# Patient Record
Sex: FEMALE | Employment: FULL TIME | URBAN - METROPOLITAN AREA
[De-identification: names, ages, dates, MRNs, and addresses within clinical notes are randomized per-mention and may not be internally consistent; named-entity substitution may affect disease eponyms.]

---

## 2021-05-07 ENCOUNTER — IMMUNIZATIONS (OUTPATIENT)
Dept: FAMILY MEDICINE CLINIC | Facility: HOSPITAL | Age: 66
End: 2021-05-07

## 2021-05-07 DIAGNOSIS — Z23 ENCOUNTER FOR IMMUNIZATION: Primary | ICD-10-CM

## 2021-05-07 PROCEDURE — 0011A SARS-COV-2 / COVID-19 MRNA VACCINE (MODERNA) 100 MCG: CPT

## 2021-05-07 PROCEDURE — 91301 SARS-COV-2 / COVID-19 MRNA VACCINE (MODERNA) 100 MCG: CPT

## 2021-06-08 ENCOUNTER — IMMUNIZATIONS (OUTPATIENT)
Dept: FAMILY MEDICINE CLINIC | Facility: HOSPITAL | Age: 66
End: 2021-06-08

## 2021-06-08 DIAGNOSIS — Z23 ENCOUNTER FOR IMMUNIZATION: Primary | ICD-10-CM

## 2021-06-08 PROCEDURE — 0012A SARS-COV-2 / COVID-19 MRNA VACCINE (MODERNA) 100 MCG: CPT

## 2021-06-08 PROCEDURE — 91301 SARS-COV-2 / COVID-19 MRNA VACCINE (MODERNA) 100 MCG: CPT

## 2021-06-26 ENCOUNTER — HOSPITAL ENCOUNTER (EMERGENCY)
Facility: HOSPITAL | Age: 66
Discharge: HOME | End: 2021-06-26
Attending: EMERGENCY MEDICINE
Payer: COMMERCIAL

## 2021-06-26 VITALS
SYSTOLIC BLOOD PRESSURE: 127 MMHG | RESPIRATION RATE: 18 BRPM | WEIGHT: 116.18 LBS | DIASTOLIC BLOOD PRESSURE: 66 MMHG | TEMPERATURE: 96.8 F | OXYGEN SATURATION: 97 % | HEART RATE: 93 BPM

## 2021-06-26 DIAGNOSIS — F11.23 OPIATE WITHDRAWAL (HCC): Primary | ICD-10-CM

## 2021-06-26 LAB
ALBUMIN SERPL BCP-MCNC: 3.5 G/DL (ref 3.5–5)
ALP SERPL-CCNC: 85 U/L (ref 46–116)
ALT SERPL W P-5'-P-CCNC: 32 U/L (ref 12–78)
AMPHETAMINES SERPL QL SCN: NEGATIVE
ANION GAP SERPL CALCULATED.3IONS-SCNC: 11 MMOL/L (ref 4–13)
APAP SERPL-MCNC: <2 UG/ML (ref 10–20)
AST SERPL W P-5'-P-CCNC: 21 U/L (ref 5–45)
BACTERIA UR QL AUTO: ABNORMAL /HPF
BARBITURATES UR QL: NEGATIVE
BASOPHILS # BLD AUTO: 0.02 THOUSANDS/ΜL (ref 0–0.1)
BASOPHILS NFR BLD AUTO: 0 % (ref 0–1)
BENZODIAZ UR QL: NEGATIVE
BILIRUB SERPL-MCNC: 0.46 MG/DL (ref 0.2–1)
BILIRUB UR QL STRIP: NEGATIVE
BUN SERPL-MCNC: 13 MG/DL (ref 5–25)
CALCIUM SERPL-MCNC: 9.1 MG/DL (ref 8.3–10.1)
CHLORIDE SERPL-SCNC: 100 MMOL/L (ref 100–108)
CLARITY UR: ABNORMAL
CO2 SERPL-SCNC: 31 MMOL/L (ref 21–32)
COCAINE UR QL: NEGATIVE
COLOR UR: YELLOW
CREAT SERPL-MCNC: 0.74 MG/DL (ref 0.6–1.3)
EOSINOPHIL # BLD AUTO: 0.01 THOUSAND/ΜL (ref 0–0.61)
EOSINOPHIL NFR BLD AUTO: 0 % (ref 0–6)
ERYTHROCYTE [DISTWIDTH] IN BLOOD BY AUTOMATED COUNT: 11.7 % (ref 11.6–15.1)
ETHANOL SERPL-MCNC: <3 MG/DL (ref 0–3)
GFR SERPL CREATININE-BSD FRML MDRD: 86 ML/MIN/1.73SQ M
GLUCOSE SERPL-MCNC: 109 MG/DL (ref 65–140)
GLUCOSE UR STRIP-MCNC: NEGATIVE MG/DL
HCT VFR BLD AUTO: 42.8 % (ref 34.8–46.1)
HGB BLD-MCNC: 13.6 G/DL (ref 11.5–15.4)
HGB UR QL STRIP.AUTO: NEGATIVE
IMM GRANULOCYTES # BLD AUTO: 0.02 THOUSAND/UL (ref 0–0.2)
IMM GRANULOCYTES NFR BLD AUTO: 0 % (ref 0–2)
KETONES UR STRIP-MCNC: NEGATIVE MG/DL
LEUKOCYTE ESTERASE UR QL STRIP: NEGATIVE
LYMPHOCYTES # BLD AUTO: 0.63 THOUSANDS/ΜL (ref 0.6–4.47)
LYMPHOCYTES NFR BLD AUTO: 6 % (ref 14–44)
MCH RBC QN AUTO: 30.6 PG (ref 26.8–34.3)
MCHC RBC AUTO-ENTMCNC: 31.8 G/DL (ref 31.4–37.4)
MCV RBC AUTO: 96 FL (ref 82–98)
METHADONE UR QL: NEGATIVE
MONOCYTES # BLD AUTO: 0.29 THOUSAND/ΜL (ref 0.17–1.22)
MONOCYTES NFR BLD AUTO: 3 % (ref 4–12)
MUCOUS THREADS UR QL AUTO: ABNORMAL
NEUTROPHILS # BLD AUTO: 9.18 THOUSANDS/ΜL (ref 1.85–7.62)
NEUTS SEG NFR BLD AUTO: 91 % (ref 43–75)
NITRITE UR QL STRIP: NEGATIVE
NON-SQ EPI CELLS URNS QL MICRO: ABNORMAL /HPF
NRBC BLD AUTO-RTO: 0 /100 WBCS
OPIATES UR QL SCN: POSITIVE
OXYCODONE+OXYMORPHONE UR QL SCN: POSITIVE
PCP UR QL: NEGATIVE
PH UR STRIP.AUTO: 8.5 [PH]
PLATELET # BLD AUTO: 306 THOUSANDS/UL (ref 149–390)
PMV BLD AUTO: 9.2 FL (ref 8.9–12.7)
POTASSIUM SERPL-SCNC: 3.9 MMOL/L (ref 3.5–5.3)
PROT SERPL-MCNC: 7.2 G/DL (ref 6.4–8.2)
PROT UR STRIP-MCNC: ABNORMAL MG/DL
RBC # BLD AUTO: 4.44 MILLION/UL (ref 3.81–5.12)
RBC #/AREA URNS AUTO: ABNORMAL /HPF
SALICYLATES SERPL-MCNC: <3 MG/DL (ref 3–20)
SODIUM SERPL-SCNC: 142 MMOL/L (ref 136–145)
SP GR UR STRIP.AUTO: 1.02 (ref 1–1.03)
THC UR QL: NEGATIVE
UROBILINOGEN UR QL STRIP.AUTO: 0.2 E.U./DL
WBC # BLD AUTO: 10.15 THOUSAND/UL (ref 4.31–10.16)
WBC #/AREA URNS AUTO: ABNORMAL /HPF

## 2021-06-26 PROCEDURE — 99284 EMERGENCY DEPT VISIT MOD MDM: CPT | Performed by: EMERGENCY MEDICINE

## 2021-06-26 PROCEDURE — 99285 EMERGENCY DEPT VISIT HI MDM: CPT

## 2021-06-26 PROCEDURE — 80143 DRUG ASSAY ACETAMINOPHEN: CPT | Performed by: EMERGENCY MEDICINE

## 2021-06-26 PROCEDURE — 81001 URINALYSIS AUTO W/SCOPE: CPT | Performed by: EMERGENCY MEDICINE

## 2021-06-26 PROCEDURE — 80053 COMPREHEN METABOLIC PANEL: CPT | Performed by: EMERGENCY MEDICINE

## 2021-06-26 PROCEDURE — 80307 DRUG TEST PRSMV CHEM ANLYZR: CPT | Performed by: EMERGENCY MEDICINE

## 2021-06-26 PROCEDURE — 36415 COLL VENOUS BLD VENIPUNCTURE: CPT | Performed by: EMERGENCY MEDICINE

## 2021-06-26 PROCEDURE — 85025 COMPLETE CBC W/AUTO DIFF WBC: CPT | Performed by: EMERGENCY MEDICINE

## 2021-06-26 PROCEDURE — 80179 DRUG ASSAY SALICYLATE: CPT | Performed by: EMERGENCY MEDICINE

## 2021-06-26 PROCEDURE — 82077 ASSAY SPEC XCP UR&BREATH IA: CPT | Performed by: EMERGENCY MEDICINE

## 2021-06-26 RX ORDER — ONDANSETRON 4 MG/1
4 TABLET, ORALLY DISINTEGRATING ORAL ONCE
Status: DISCONTINUED | OUTPATIENT
Start: 2021-06-26 | End: 2021-06-26 | Stop reason: HOSPADM

## 2021-06-26 RX ORDER — TRAMADOL HYDROCHLORIDE 50 MG/1
100 TABLET ORAL 3 TIMES DAILY
COMMUNITY

## 2021-06-26 RX ORDER — TIZANIDINE 4 MG/1
4 TABLET ORAL
COMMUNITY

## 2021-06-26 RX ORDER — CLONIDINE HYDROCHLORIDE 0.2 MG/1
0.1 TABLET ORAL 2 TIMES DAILY
Qty: 12 TABLET | Refills: 0 | Status: SHIPPED | OUTPATIENT
Start: 2021-06-26

## 2021-06-26 RX ORDER — NORTRIPTYLINE HYDROCHLORIDE 50 MG/1
100 CAPSULE ORAL
COMMUNITY

## 2021-06-26 RX ORDER — CLONIDINE HYDROCHLORIDE 0.1 MG/1
0.1 TABLET ORAL ONCE
Status: COMPLETED | OUTPATIENT
Start: 2021-06-26 | End: 2021-06-26

## 2021-06-26 RX ADMIN — CLONIDINE HYDROCHLORIDE 0.1 MG: 0.1 TABLET ORAL at 15:38
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
## 2021-06-26 NOTE — ED PROVIDER NOTES
History  Chief Complaint   Patient presents with    Withdrawal - Drug     here for withdrawal from fentenyl, states uses IV 4-5 bags a day, last use was yesterday,      50-year-old female presents the ED complaining of heroin use 4-5 bags a day with fentanyl states that she is trying to get off that and wants some help with that  States she cannot go inpatient detox due  to obligations at home  Patient is willing to speak to [facility] and they have been called  Patient is very anxious has the shakes at this point and states she does not feel well  No other complaints      History provided by:  Patient   used: No        Prior to Admission Medications   Prescriptions Last Dose Informant Patient Reported? Taking?   nortriptyline (PAMELOR) 50 mg capsule 6/25/2021 at Unknown time  Yes Yes   Sig: Take 100 mg by mouth daily at bedtime   tiZANidine (Zanaflex) 4 mg tablet 6/25/2021 at Unknown time  Yes Yes   Sig: Take 4 mg by mouth daily at bedtime   traMADol (ULTRAM) 50 mg tablet 6/26/2021 at 1400  Yes Yes   Sig: Take 100 mg by mouth 3 (three) times a day      Facility-Administered Medications: None       Past Medical History:   Diagnosis Date    Chronic pain        Past Surgical History:   Procedure Laterality Date    CHOLECYSTECTOMY         No family history on file  I have reviewed and agree with the history as documented  E-Cigarette/Vaping    E-Cigarette Use Never User      E-Cigarette/Vaping Substances     Social History     Tobacco Use    Smoking status: Current Every Day Smoker     Packs/day: 1 00    Smokeless tobacco: Never Used   Vaping Use    Vaping Use: Never used   Substance Use Topics    Alcohol use: Never    Drug use: Yes     Types: Other     Comment: 4-5 bags of fentenyl/day       Review of Systems   Constitutional: Negative for activity change, chills, diaphoresis and fever     HENT: Negative for congestion, ear pain, nosebleeds, sore throat, trouble swallowing and voice change  Eyes: Negative for pain, discharge and redness  Respiratory: Negative for apnea, cough, choking, shortness of breath, wheezing and stridor  Cardiovascular: Negative for chest pain and palpitations  Gastrointestinal: Negative for abdominal distention, abdominal pain, constipation, diarrhea, nausea and vomiting  Endocrine: Negative for polydipsia  Genitourinary: Negative for difficulty urinating, dysuria, flank pain, frequency, hematuria and urgency  Musculoskeletal: Negative for back pain, gait problem, joint swelling, myalgias, neck pain and neck stiffness  Skin: Negative for pallor and rash  Neurological: Negative for dizziness, tremors, syncope, speech difficulty, weakness, numbness and headaches  Hematological: Negative for adenopathy  Psychiatric/Behavioral: Positive for agitation  Negative for confusion, hallucinations, self-injury and suicidal ideas  The patient is nervous/anxious  Physical Exam  Physical Exam  Vitals and nursing note reviewed  Constitutional:       General: She is not in acute distress  Appearance: She is well-developed  She is not diaphoretic  Comments: Patient appears very anxious  HENT:      Head: Normocephalic and atraumatic  Right Ear: External ear normal       Left Ear: External ear normal       Nose: Nose normal    Eyes:      Conjunctiva/sclera: Conjunctivae normal       Pupils: Pupils are equal, round, and reactive to light  Cardiovascular:      Rate and Rhythm: Normal rate and regular rhythm  Heart sounds: Normal heart sounds  Pulmonary:      Effort: Pulmonary effort is normal       Breath sounds: Normal breath sounds  Abdominal:      General: Bowel sounds are normal       Palpations: Abdomen is soft  Musculoskeletal:         General: Normal range of motion  Cervical back: Normal range of motion and neck supple  Skin:     General: Skin is warm and dry     Neurological:      Mental Status: She is alert and oriented to person, place, and time  Deep Tendon Reflexes: Reflexes are normal and symmetric  Psychiatric:         Behavior: Behavior is cooperative  Vital Signs  ED Triage Vitals [06/26/21 1455]   Temperature Pulse Respirations Blood Pressure SpO2   (!) 96 8 °F (36 °C) 99 20 109/59 97 %      Temp Source Heart Rate Source Patient Position - Orthostatic VS BP Location FiO2 (%)   Tympanic Monitor Sitting Right arm --      Pain Score       4           Vitals:    06/26/21 1455 06/26/21 1537 06/26/21 1622 06/26/21 1629   BP: 109/59 131/74 127/66    Pulse: 99 102 93 93   Patient Position - Orthostatic VS: Sitting Sitting Sitting          Visual Acuity      ED Medications  Medications   ondansetron (ZOFRAN-ODT) dispersible tablet 4 mg (4 mg Oral Not Given 6/26/21 1540)   cloNIDine (CATAPRES) tablet 0 1 mg (0 1 mg Oral Given 6/26/21 1538)       Diagnostic Studies  Results Reviewed     Procedure Component Value Units Date/Time    Rapid drug screen, urine [[id number]]  (Abnormal) Collected: 06/26/21 1625    Lab Status: Final result Specimen: Urine, Clean Catch Updated: 06/26/21 1649     Amph/Meth UR Negative     Barbiturate Ur Negative     Benzodiazepine Urine Negative     Cocaine Urine Negative     Methadone Urine Negative     Opiate Urine Positive     PCP Ur Negative     THC Urine Negative     Oxycodone Urine Positive    Narrative:      Presumptive report  If requested, specimen will be sent to reference lab for confirmation  FOR MEDICAL PURPOSES ONLY  IF CONFIRMATION NEEDED PLEASE CONTACT THE LAB WITHIN 5 DAYS      Drug Screen Cutoff Levels:  AMPHETAMINE/METHAMPHETAMINES  1000 ng/mL  BARBITURATES     200 ng/mL  BENZODIAZEPINES     200 ng/mL  COCAINE      300 ng/mL  METHADONE      300 ng/mL  OPIATES      300 ng/mL  PHENCYCLIDINE     25 ng/mL  THC       50 ng/mL  OXYCODONE      100 ng/mL    Urine Microscopic [[id number]]  (Abnormal) Collected: 06/26/21 1625    Lab Status: Final result Specimen: Urine, Clean Catch Updated: 06/26/21 1639     RBC, UA 1-2 /hpf      WBC, UA 2-4 /hpf      Epithelial Cells Occasional /hpf      Bacteria, UA Moderate /hpf      MUCUS THREADS Moderate    UA (URINE) with reflex to Scope [[id number]]  (Abnormal) Collected: 06/26/21 1625    Lab Status: Final result Specimen: Urine, Clean Catch Updated: 06/26/21 1632     Color, UA Yellow     Clarity, UA Cloudy     Specific [place], UA 1 020     pH, UA 8 5     Leukocytes, UA Negative     Nitrite, UA Negative     Protein, UA Trace mg/dl      Glucose, UA Negative mg/dl      Ketones, UA Negative mg/dl      Urobilinogen, UA 0 2 E U /dl      Bilirubin, UA Negative     Blood, UA Negative    Acetaminophen level-If concentration is detectable, please discuss with medical  on call  [[id number]]  (Abnormal) Collected: 06/26/21 1548    Lab Status: Final result Specimen: Blood from Arm, Right Updated: 06/26/21 1615     Acetaminophen Level <2 ug/mL     Ethanol [[id number]]  (Normal) Collected: 06/26/21 1548    Lab Status: Final result Specimen: Blood from Arm, Right Updated: 06/26/21 1614     Ethanol Lvl <3 mg/dL     CBC and differential [[id number]]  (Abnormal) Collected: 06/26/21 1548    Lab Status: Final result Specimen: Blood from Arm, Right Updated: 06/26/21 1611     WBC 10 15 Thousand/uL      RBC 4 44 Million/uL      Hemoglobin 13 6 g/dL      Hematocrit 42 8 %      MCV 96 fL      MCH 30 6 pg      MCHC 31 8 g/dL      RDW 11 7 %      MPV 9 2 fL      Platelets 546 Thousands/uL      nRBC 0 /100 WBCs      Neutrophils Relative 91 %      Immat GRANS % 0 %      Lymphocytes Relative 6 %      Monocytes Relative 3 %      Eosinophils Relative 0 %      Basophils Relative 0 %      Neutrophils Absolute 9 18 Thousands/µL      Immature Grans Absolute 0 02 Thousand/uL      Lymphocytes Absolute 0 63 Thousands/µL      Monocytes Absolute 0 29 Thousand/µL      Eosinophils Absolute 0 01 Thousand/µL      Basophils Absolute 0 02 Thousands/µL     Narrative:       This is an appended report  These results have been appended to a previously verified report      Comprehensive metabolic panel [[id number]] Collected: 06/26/21 1548    Lab Status: Final result Specimen: Blood from Arm, Right Updated: 06/26/21 1610     Sodium 142 mmol/L      Potassium 3 9 mmol/L      Chloride 100 mmol/L      CO2 31 mmol/L      ANION GAP 11 mmol/L      BUN 13 mg/dL      Creatinine 0 74 mg/dL      Glucose 109 mg/dL      Calcium 9 1 mg/dL      AST 21 U/L      ALT 32 U/L      Alkaline Phosphatase 85 U/L      Total Protein 7 2 g/dL      Albumin 3 5 g/dL      Total Bilirubin 0 46 mg/dL      eGFR 86 ml/min/1 73sq m     Narrative:      [place] guidelines for Chronic Kidney Disease (CKD):     Stage 1 with normal or high GFR (GFR > 90 mL/min/1 73 square meters)    Stage 2 Mild CKD (GFR = 60-89 mL/min/1 73 square meters)    Stage 3A Moderate CKD (GFR = 45-59 mL/min/1 73 square meters)    Stage 3B Moderate CKD (GFR = 30-44 mL/min/1 73 square meters)    Stage 4 Severe CKD (GFR = 15-29 mL/min/1 73 square meters)    Stage 5 End Stage CKD (GFR <15 mL/min/1 73 square meters)  Note: GFR calculation is accurate only with a steady state creatinine    Salicylate level [[id number]]  (Abnormal) Collected: 06/26/21 1548    Lab Status: Final result Specimen: Blood from Arm, Right Updated: 42/78/28 3977     Salicylate Lvl <3 mg/dL                  No orders to display              Procedures  Procedures         ED Course           Clinical Opiate Withdrawal Scale     Row Name 06/26/21 1629                Pulse  93  -MB        Resting Pulse Rate: Measured After Patient is Sitting or Lying for One Minute  1  -MB        GI Upset: Over Last Half Hour  0  -MB        Sweating: Over Past Half Hour Not Accounted for by Room Temperature of Patient Activity  0  -MB        Tremor: Observation of Outstretched Hands  4  -MB        Restlessness: Observation During Assessment  3  -MB        Yawning: Observation During Assessment  1  -MB        Pupil Size  1  -MB        Anxiety and Irritability  2  -MB        Bone or Joint Aches: If Patient was Having Pain Previously, Only the Additional Component Attributed to Opiate Withdrawal is Scored  1  -MB        Gooseflesh Skin  0  -MB        Runny Nose or Tearing: Not Accounted for by Cold Symptoms or Allergies  1  -MB        Clinical Opiate Withdrawal Scale Total Score  14  -MB        Heart Rate Source  --        Patient Position - Orthostatic VS  --          User Key  (r) = Recorded By, (t) = Taken By, (c) = Cosigned By    Initials Name    JOSI Han, MARSHA                        SBIRT 20yo+      Most Recent Value   SBIRT (23 yo +)   In order to provide better care to our patients, we are screening all of our patients for alcohol and drug use  Would it be okay to ask you these screening questions? No Filed at: 06/26/2021 [address] [place] [address]  Number of Diagnoses or Management Options  Opiate withdrawal ([facility] [address] )  Diagnosis management comments: Warm handoff to[facility] who will  Follow up with patient  Patient is refusing further help according to the [facility] personnel  They did give her information for follow-up and I did give her the state line medical address and phone number for methadone follow-up advised things get worse return to the ED  Patient and family member understand      Disposition  Final diagnoses:   Opiate withdrawal ([facility] [address] )     Time reflects when diagnosis was documented in both MDM as applicable and the Disposition within this note     Time User Action Codes Description Comment    6/26/2021  4:38 PM Lauren Mcmillan Add [Z79 57] Opiate withdrawal [facility])       ED Disposition     ED Disposition Condition Date/Time Comment    Discharge Stable Sat Jun 26, 2021  4:38 PM Dariusz Hudrocky discharge to home/self care              Follow-up Information     Follow up With Specialties Details Why Contact Info Additional [address] Emergency Department Emergency Medicine  As needed [address] [address]  [address] [address] Emergency Department, [place], [place], [address]          Discharge Medication List as of 6/26/2021  4:39 PM      START taking these medications    Details   cloNIDine (CATAPRES) 0 2 mg tablet Take 0 5 tablets (0 1 mg total) by mouth 2 (two) times a day, Starting Sat 6/26/2021, Normal         CONTINUE these medications which have NOT CHANGED    Details   nortriptyline (PAMELOR) 50 mg capsule Take 100 mg by mouth daily at bedtime, Historical Med      tiZANidine (Zanaflex) 4 mg tablet Take 4 mg by mouth daily at bedtime, Historical Med      traMADol (ULTRAM) 50 mg tablet Take 100 mg by mouth 3 (three) times a day, Historical Med           No discharge procedures on file      PDMP Review     None          ED Provider  Electronically Signed by           Wicho Reed DO  06/26/21 1914

## 2021-08-22 ENCOUNTER — APPOINTMENT (EMERGENCY)
Dept: RADIOLOGY | Facility: HOSPITAL | Age: 66
End: 2021-08-22
Payer: COMMERCIAL

## 2021-08-22 ENCOUNTER — HOSPITAL ENCOUNTER (EMERGENCY)
Facility: HOSPITAL | Age: 66
Discharge: HOME | End: 2021-08-22
Attending: EMERGENCY MEDICINE | Admitting: EMERGENCY MEDICINE
Payer: COMMERCIAL

## 2021-08-22 VITALS
DIASTOLIC BLOOD PRESSURE: 65 MMHG | TEMPERATURE: 96.9 F | HEART RATE: 80 BPM | SYSTOLIC BLOOD PRESSURE: 150 MMHG | RESPIRATION RATE: 18 BRPM | OXYGEN SATURATION: 96 %

## 2021-08-22 DIAGNOSIS — K21.00 REFLUX ESOPHAGITIS: ICD-10-CM

## 2021-08-22 DIAGNOSIS — R09.89 CHOKING EPISODE: Primary | ICD-10-CM

## 2021-08-22 PROCEDURE — 99284 EMERGENCY DEPT VISIT MOD MDM: CPT | Performed by: EMERGENCY MEDICINE

## 2021-08-22 PROCEDURE — 99284 EMERGENCY DEPT VISIT MOD MDM: CPT

## 2021-08-22 PROCEDURE — 71045 X-RAY EXAM CHEST 1 VIEW: CPT

## 2021-08-22 RX ORDER — ALBUTEROL SULFATE 90 UG/1
2 AEROSOL, METERED RESPIRATORY (INHALATION) EVERY 6 HOURS PRN
Qty: 8.5 G | Refills: 0 | Status: SHIPPED | OUTPATIENT
Start: 2021-08-22

## 2021-08-22 RX ORDER — LIDOCAINE HYDROCHLORIDE 20 MG/ML
15 SOLUTION OROPHARYNGEAL 4 TIMES DAILY PRN
Qty: 15 ML | Refills: 0 | Status: SHIPPED | OUTPATIENT
Start: 2021-08-22 | End: 2021-08-25

## 2021-08-22 RX ORDER — METHADONE HYDROCHLORIDE 10 MG/ML
90 CONCENTRATE ORAL DAILY
COMMUNITY

## 2021-08-22 RX ORDER — GUAIFENESIN/DEXTROMETHORPHAN 100-10MG/5
10 SYRUP ORAL ONCE
Status: COMPLETED | OUTPATIENT
Start: 2021-08-22 | End: 2021-08-22

## 2021-08-22 RX ADMIN — DEXAMETHASONE SODIUM PHOSPHATE 10 MG: 10 INJECTION, SOLUTION INTRAMUSCULAR; INTRAVENOUS at 14:31

## 2021-08-22 RX ADMIN — GUAIFENESIN AND DEXTROMETHORPHAN 10 ML: 100; 10 SYRUP ORAL at 13:14

## 2021-08-22 NOTE — ED PROVIDER NOTES
History  Chief Complaint   Patient presents with    Aspiration     Patient was taking liquid dose of 90mg Methadone at home and states it went down the wrong pipe  Still coughing during triage; O2 saturation at 96% on RA     59 y/o female presents with choking on her methadone liquid, today  Says her throat is sore, some spasm of her air pipe, no wheezing or shortness of breath  No cough  No other complaints  History provided by:  Patient   used: No        Prior to Admission Medications   Prescriptions Last Dose Informant Patient Reported? Taking? cloNIDine (CATAPRES) 0 2 mg tablet Not Taking at Unknown time  No No   Sig: Take 0 5 tablets (0 1 mg total) by mouth 2 (two) times a day   Patient not taking: Reported on 8/22/2021   methadone (DOLOPHINE) 10 mg/mL oral concentrated solution 8/22/2021 at Unknown time  Yes Yes   Sig: Take 90 mg by mouth daily   nortriptyline (PAMELOR) 50 mg capsule Not Taking at Unknown time  Yes No   Sig: Take 100 mg by mouth daily at bedtime   Patient not taking: Reported on 8/22/2021   tiZANidine (Zanaflex) 4 mg tablet Not Taking at Unknown time  Yes No   Sig: Take 4 mg by mouth daily at bedtime   Patient not taking: Reported on 8/22/2021   traMADol (ULTRAM) 50 mg tablet Not Taking at Unknown time  Yes No   Sig: Take 100 mg by mouth 3 (three) times a day   Patient not taking: Reported on 8/22/2021      Facility-Administered Medications: None       Past Medical History:   Diagnosis Date    Chronic pain        Past Surgical History:   Procedure Laterality Date    CHOLECYSTECTOMY         History reviewed  No pertinent family history  I have reviewed and agree with the history as documented      E-Cigarette/Vaping    E-Cigarette Use Never User      E-Cigarette/Vaping Substances     Social History     Tobacco Use    Smoking status: Current Every Day Smoker     Packs/day: 1 00    Smokeless tobacco: Never Used   Vaping Use    Vaping Use: Never used   Substance Use Topics    Alcohol use: Never    Drug use: Not Currently     Types: Other     Comment: 4-5 bags of fentenyl/day       Review of Systems   Constitutional: Negative  HENT: Negative  Eyes: Negative  Respiratory: Positive for cough, choking and shortness of breath  Negative for wheezing  Cardiovascular: Negative  Gastrointestinal: Negative  Endocrine: Negative  Genitourinary: Negative  Musculoskeletal: Negative  Skin: Negative  Allergic/Immunologic: Negative  Neurological: Negative  Hematological: Negative  Psychiatric/Behavioral: Negative  All other systems reviewed and are negative  Physical Exam  Physical Exam  Constitutional:       Appearance: Normal appearance  HENT:      Head: Normocephalic and atraumatic  Nose: Nose normal       Mouth/Throat:      Mouth: Mucous membranes are moist    Eyes:      Extraocular Movements: Extraocular movements intact  Pupils: Pupils are equal, round, and reactive to light  Cardiovascular:      Rate and Rhythm: Normal rate and regular rhythm  Pulmonary:      Effort: Pulmonary effort is normal       Breath sounds: Normal breath sounds  Abdominal:      General: Abdomen is flat  Bowel sounds are normal       Palpations: Abdomen is soft  Musculoskeletal:         General: Normal range of motion  Cervical back: Normal range of motion and neck supple  Skin:     General: Skin is warm  Capillary Refill: Capillary refill takes less than 2 seconds  Neurological:      General: No focal deficit present  Mental Status: She is alert and oriented to person, place, and time  Mental status is at baseline  Psychiatric:         Mood and Affect: Mood normal          Thought Content:  Thought content normal          Vital Signs  ED Triage Vitals [08/22/21 1306]   Temperature Pulse Respirations Blood Pressure SpO2   (!) 96 9 °F (36 1 °C) 85 18 125/58 96 %      Temp Source Heart Rate Source Patient Position - Orthostatic VS BP Location FiO2 (%)   Tympanic Monitor Sitting Left arm --      Pain Score       --           Vitals:    08/22/21 1306 08/22/21 1400 08/22/21 1444   BP: 125/58  150/65   Pulse: 85 80 80   Patient Position - Orthostatic VS: Sitting           Visual Acuity      ED Medications  Medications   dextromethorphan-guaiFENesin (ROBITUSSIN DM) oral syrup 10 mL (10 mL Oral Given 8/22/21 1314)   dexamethasone oral liquid 10 mg 1 mL (10 mg Oral Given 8/22/21 1431)       Diagnostic Studies  Results Reviewed     None                 XR chest 1 view portable   Final Result by Jonnie Siegel MD (08/22 1707)      No acute cardiopulmonary disease  Workstation performed: [id number]                    Procedures  Procedures         ED Course                             SBIRT 20yo+      Most Recent Value   SBIRT (22 yo +)   In order to provide better care to our patients, we are screening all of our patients for alcohol and drug use  Would it be okay to ask you these screening questions? Yes Filed at: 08/22/2021 1313   Initial Alcohol Screen: US AUDIT-C    1  How often do you have a drink containing alcohol?  0 Filed at: 08/22/2021 1313   2  How many drinks containing alcohol do you have on a typical day you are drinking? 0 Filed at: 08/22/2021 1313   3a  Male UNDER 65: How often do you have five or more drinks on one occasion? 0 Filed at: 08/22/2021 1313   3b  FEMALE Any Age, or MALE 65+: How often do you have 4 or more drinks on one occassion? 0 Filed at: 08/22/2021 1313   Audit-C Score  0 Filed at: 08/22/2021 1313   KHALIDA: How many times in the past year have you    Used an illegal drug or used a prescription medication for non-medical reasons?   Never Filed at: 08/22/2021 1313                    MDM  Number of Diagnoses or Management Options     Amount and/or Complexity of Data Reviewed  Tests in the radiology section of CPT®: reviewed and ordered  Tests in the medicine section of CPT®: ordered and reviewed    Patient Progress  Patient progress: stable      Disposition  Final diagnoses:   Choking episode   Reflux esophagitis     Time reflects when diagnosis was documented in both MDM as applicable and the Disposition within this note     Time User Action Codes Description Comment    8/22/2021  2:41 PM AmyGarret Add [R09 [id number]] Choking episode     8/22/2021  2:41 PM AmyCollette Naeem [K21 00] Reflux esophagitis       ED Disposition     ED Disposition Condition Date/Time Comment    Discharge Stable Sun Aug 22, 2021  2:41 PM Briana Mcgraw discharge to home/self care              Follow-up Information     Follow up With Specialties Details Why Contact Info Additional Information    [address] [facility] Emergency Department Emergency Medicine  If symptoms worsen [address] [address] Emergency Department, [facility], [address]          Discharge Medication List as of 8/22/2021  2:42 PM      START taking these medications    Details   albuterol (ProAir HFA) 90 mcg/act inhaler Inhale 2 puffs every 6 (six) hours as needed for wheezing, Starting Sun 8/22/2021, Normal      Lidocaine Viscous HCl (XYLOCAINE) 2 % mucosal solution Swish and swallow 15 mL 4 (four) times a day as needed for mouth pain or discomfort for up to 3 days, Starting Sun 8/22/2021, Until Wed 8/25/2021 at 2359, Normal         CONTINUE these medications which have NOT CHANGED    Details   methadone (DOLOPHINE) 10 mg/mL oral concentrated solution Take 90 mg by mouth daily, Historical Med      cloNIDine (CATAPRES) 0 2 mg tablet Take 0 5 tablets (0 1 mg total) by mouth 2 (two) times a day, Starting Sat 6/26/2021, Normal      nortriptyline (PAMELOR) 50 mg capsule Take 100 mg by mouth daily at bedtime, Historical Med      tiZANidine (Zanaflex) 4 mg tablet Take 4 mg by mouth daily at bedtime, Historical Med      traMADol (ULTRAM) 50 mg tablet Take 100 mg by mouth 3 (three) times a day, Historical Med           No discharge procedures on file      PDMP Review     None          ED Provider  Electronically Signed by           Timi Hester DO  08/24/21 3620

## 2023-04-04 ENCOUNTER — OFFICE VISIT (OUTPATIENT)
Dept: URGENT CARE | Facility: CLINIC | Age: 68
End: 2023-04-04

## 2023-04-04 VITALS
TEMPERATURE: 98.9 F | OXYGEN SATURATION: 99 % | HEART RATE: 68 BPM | BODY MASS INDEX: 18.64 KG/M2 | WEIGHT: 123 LBS | RESPIRATION RATE: 18 BRPM | HEIGHT: 68 IN | DIASTOLIC BLOOD PRESSURE: 58 MMHG | SYSTOLIC BLOOD PRESSURE: 119 MMHG

## 2023-04-04 DIAGNOSIS — J31.0 RHINOSINUSITIS: Primary | ICD-10-CM

## 2023-04-04 DIAGNOSIS — J32.9 RHINOSINUSITIS: Primary | ICD-10-CM

## 2023-04-04 NOTE — PROGRESS NOTES
[facility]        NAME: Deandre Logan is a 78 y o  female  : 1955    MRN: [id number]  DATE: 2023  TIME: 4:44 PM    Assessment and Plan   Rhinosinusitis [J31 0, J32 9]  1  Rhinosinusitis  Covid/Flu-Office Collect            Patient Instructions     Patient Instructions   Discussed symptoms are most likely viral in nature based on duration and symptoms, no signs of bacterial infection at this point  Recommend taking over-the-counter cough and cold medication and maintaining adequate fluid hydration  COVID/flu swab performed with results to be in in 24 hours  Follow up with PCP in 3-5 days  Proceed to  ER if symptoms worsen  Chief Complaint     Chief Complaint   Patient presents with   • Sinusitis     Facial and head pain secretions that have an odor began yesterday         History of Present Illness       Patient is a 70-year-old female presenting today with cold symptoms x2 days  Patient notes a couple days ago she started with some nasal congestion followed by sinus pain and pressure and states that her nasal secretions have a odor to it, has not taken any medication alleviating factors for her symptoms other than Tylenol approximately 9 hours prior to this visit, admits to a tactile fever earlier this morning, currently afebrile with no use of antipyretics in her system  Expressing concern that she has a sinus infection, states she will not get better unless she is on one  Denies trouble swallowing, chest tightness, SOB, N/V/D  Review of Systems   Review of Systems   Constitutional: Negative for chills, fatigue and fever  HENT: Positive for congestion, postnasal drip, rhinorrhea, sinus pressure and sinus pain  Negative for ear pain, sore throat and trouble swallowing  Eyes: Negative for pain  Respiratory: Negative for cough and shortness of breath  Gastrointestinal: Negative for abdominal pain  Musculoskeletal: Negative for arthralgias and myalgias  "  Skin: Negative for rash  Neurological: Negative for headaches  Current Medications       Current Outpatient Medications:   •  methadone (DOLOPHINE) 10 mg/mL oral concentrated solution, Take 90 mg by mouth daily, Disp: , Rfl:   •  albuterol (ProAir HFA) 90 mcg/act inhaler, Inhale 2 puffs every 6 (six) hours as needed for wheezing (Patient not taking: Reported on 4/4/2023), Disp: 8 5 g, Rfl: 0  •  cloNIDine (CATAPRES) 0 2 mg tablet, Take 0 5 tablets (0 1 mg total) by mouth 2 (two) times a day (Patient not taking: Reported on 8/22/2021), Disp: 12 tablet, Rfl: 0  •  nortriptyline (PAMELOR) 50 mg capsule, Take 100 mg by mouth daily at bedtime (Patient not taking: Reported on 8/22/2021), Disp: , Rfl:   •  tiZANidine (Zanaflex) 4 mg tablet, Take 4 mg by mouth daily at bedtime (Patient not taking: Reported on 8/22/2021), Disp: , Rfl:   •  traMADol (ULTRAM) 50 mg tablet, Take 100 mg by mouth 3 (three) times a day (Patient not taking: Reported on 8/22/2021), Disp: , Rfl:     Current Allergies     Allergies as of 04/04/2023 - Reviewed 04/04/2023   Allergen Reaction Noted   • Latex Dermatitis 06/26/2021            The following portions of the patient's history were reviewed and updated as appropriate: allergies, current medications, past family history, past medical history, past social history, past surgical history and problem list      Past Medical History:   Diagnosis Date   • Chronic pain        Past Surgical History:   Procedure Laterality Date   • CHOLECYSTECTOMY         No family history on file  Medications have been verified  Objective   /58   Pulse 68   Temp 98 9 °F (37 2 °C)   Resp 18   Ht 5' 8\" (1 727 m)   Wt 55 8 kg (123 lb)   SpO2 99%   BMI 18 70 kg/m²        Physical Exam     Physical Exam  Vitals reviewed  Constitutional:       General: She is not in acute distress  Appearance: Normal appearance  HENT:      Head: Normocephalic and atraumatic        Right Ear: " Tympanic membrane, ear canal and external ear normal       Left Ear: Tympanic membrane, ear canal and external ear normal       Nose: Congestion present  Right Turbinates: Swollen and pale  Left Turbinates: Swollen and pale  Right Sinus: Maxillary sinus tenderness present  No frontal sinus tenderness  Left Sinus: Maxillary sinus tenderness present  No frontal sinus tenderness  Mouth/Throat:      Mouth: Mucous membranes are moist       Comments: Mild erythema of pharynx, findings consistent with postnasal drip, no tonsillar swelling erythema or exudate, uvula midline  Eyes:      Conjunctiva/sclera: Conjunctivae normal       Pupils: Pupils are equal, round, and reactive to light  Cardiovascular:      Rate and Rhythm: Normal rate and regular rhythm  Pulses: Normal pulses  Heart sounds: Normal heart sounds  Pulmonary:      Effort: Pulmonary effort is normal       Breath sounds: Normal breath sounds  Musculoskeletal:      Cervical back: Normal range of motion  No tenderness  Lymphadenopathy:      Cervical: No cervical adenopathy  Skin:     General: Skin is warm  Capillary Refill: Capillary refill takes less than 2 seconds  Neurological:      Mental Status: She is alert

## 2023-04-04 NOTE — PATIENT INSTRUCTIONS
Discussed symptoms are most likely viral in nature based on duration and symptoms, no signs of bacterial infection at this point  Recommend taking over-the-counter cough and cold medication and maintaining adequate fluid hydration  COVID/flu swab performed with results to be in in 24 hours

## 2023-04-05 LAB
FLUAV RNA RESP QL NAA+PROBE: NEGATIVE
FLUBV RNA RESP QL NAA+PROBE: NEGATIVE
SARS-COV-2 RNA RESP QL NAA+PROBE: NEGATIVE

## 2025-03-01 ENCOUNTER — HOSPITAL ENCOUNTER (EMERGENCY)
Facility: HOSPITAL | Age: 70
Discharge: HOME | End: 2025-03-01
Attending: EMERGENCY MEDICINE
Payer: MEDICARE

## 2025-03-01 VITALS
HEART RATE: 87 BPM | TEMPERATURE: 98 F | OXYGEN SATURATION: 98 % | BODY MASS INDEX: 19.44 KG/M2 | WEIGHT: 127.87 LBS | DIASTOLIC BLOOD PRESSURE: 53 MMHG | SYSTOLIC BLOOD PRESSURE: 111 MMHG | RESPIRATION RATE: 18 BRPM

## 2025-03-01 DIAGNOSIS — R11.10 VOMITING AND DIARRHEA: Primary | ICD-10-CM

## 2025-03-01 DIAGNOSIS — R19.7 VOMITING AND DIARRHEA: Primary | ICD-10-CM

## 2025-03-01 LAB
ALBUMIN SERPL BCG-MCNC: 4.2 G/DL (ref 3.5–5)
ALP SERPL-CCNC: 63 U/L (ref 34–104)
ALT SERPL W P-5'-P-CCNC: 16 U/L (ref 7–52)
ANION GAP SERPL CALCULATED.3IONS-SCNC: 13 MMOL/L (ref 4–13)
AST SERPL W P-5'-P-CCNC: 20 U/L (ref 13–39)
BASOPHILS # BLD AUTO: 0.03 THOUSANDS/ÂΜL (ref 0–0.1)
BASOPHILS NFR BLD AUTO: 0 % (ref 0–1)
BILIRUB SERPL-MCNC: 0.4 MG/DL (ref 0.2–1)
BUN SERPL-MCNC: 19 MG/DL (ref 5–25)
CALCIUM SERPL-MCNC: 9 MG/DL (ref 8.4–10.2)
CHLORIDE SERPL-SCNC: 102 MMOL/L (ref 96–108)
CO2 SERPL-SCNC: 26 MMOL/L (ref 21–32)
CREAT SERPL-MCNC: 0.82 MG/DL (ref 0.6–1.3)
EOSINOPHIL # BLD AUTO: 0.12 THOUSAND/ÂΜL (ref 0–0.61)
EOSINOPHIL NFR BLD AUTO: 1 % (ref 0–6)
ERYTHROCYTE [DISTWIDTH] IN BLOOD BY AUTOMATED COUNT: 12 % (ref 11.6–15.1)
GFR SERPL CREATININE-BSD FRML MDRD: 73 ML/MIN/1.73SQ M
GLUCOSE SERPL-MCNC: 128 MG/DL (ref 65–140)
HCT VFR BLD AUTO: 44.6 % (ref 34.8–46.1)
HGB BLD-MCNC: 13.9 G/DL (ref 11.5–15.4)
IMM GRANULOCYTES # BLD AUTO: 0.02 THOUSAND/UL (ref 0–0.2)
IMM GRANULOCYTES NFR BLD AUTO: 0 % (ref 0–2)
LIPASE SERPL-CCNC: 21 U/L (ref 11–82)
LYMPHOCYTES # BLD AUTO: 0.38 THOUSANDS/ÂΜL (ref 0.6–4.47)
LYMPHOCYTES NFR BLD AUTO: 3 % (ref 14–44)
MCH RBC QN AUTO: 30.2 PG (ref 26.8–34.3)
MCHC RBC AUTO-ENTMCNC: 31.2 G/DL (ref 31.4–37.4)
MCV RBC AUTO: 97 FL (ref 82–98)
MONOCYTES # BLD AUTO: 0.52 THOUSAND/ÂΜL (ref 0.17–1.22)
MONOCYTES NFR BLD AUTO: 4 % (ref 4–12)
NEUTROPHILS # BLD AUTO: 11.4 THOUSANDS/ÂΜL (ref 1.85–7.62)
NEUTS SEG NFR BLD AUTO: 92 % (ref 43–75)
NRBC BLD AUTO-RTO: 0 /100 WBCS
PLATELET # BLD AUTO: 285 THOUSANDS/UL (ref 149–390)
PLATELET BLD QL SMEAR: ADEQUATE
PMV BLD AUTO: 9.6 FL (ref 8.9–12.7)
POTASSIUM SERPL-SCNC: 3.5 MMOL/L (ref 3.5–5.3)
PROT SERPL-MCNC: 7.4 G/DL (ref 6.4–8.4)
RBC # BLD AUTO: 4.61 MILLION/UL (ref 3.81–5.12)
RBC MORPH BLD: NORMAL
SODIUM SERPL-SCNC: 141 MMOL/L (ref 135–147)
WBC # BLD AUTO: 12.47 THOUSAND/UL (ref 4.31–10.16)

## 2025-03-01 PROCEDURE — 83690 ASSAY OF LIPASE: CPT | Performed by: PHYSICIAN ASSISTANT

## 2025-03-01 PROCEDURE — 85025 COMPLETE CBC W/AUTO DIFF WBC: CPT | Performed by: PHYSICIAN ASSISTANT

## 2025-03-01 PROCEDURE — 80053 COMPREHEN METABOLIC PANEL: CPT | Performed by: PHYSICIAN ASSISTANT

## 2025-03-01 PROCEDURE — 36415 COLL VENOUS BLD VENIPUNCTURE: CPT | Performed by: PHYSICIAN ASSISTANT

## 2025-03-01 PROCEDURE — 96376 TX/PRO/DX INJ SAME DRUG ADON: CPT

## 2025-03-01 PROCEDURE — 99284 EMERGENCY DEPT VISIT MOD MDM: CPT | Performed by: PHYSICIAN ASSISTANT

## 2025-03-01 PROCEDURE — 96375 TX/PRO/DX INJ NEW DRUG ADDON: CPT

## 2025-03-01 PROCEDURE — 96361 HYDRATE IV INFUSION ADD-ON: CPT

## 2025-03-01 PROCEDURE — 99283 EMERGENCY DEPT VISIT LOW MDM: CPT

## 2025-03-01 PROCEDURE — 96365 THER/PROPH/DIAG IV INF INIT: CPT

## 2025-03-01 RX ORDER — ONDANSETRON 2 MG/ML
4 INJECTION INTRAMUSCULAR; INTRAVENOUS ONCE
Status: COMPLETED | OUTPATIENT
Start: 2025-03-01 | End: 2025-03-01

## 2025-03-01 RX ORDER — DIPHENHYDRAMINE HYDROCHLORIDE 50 MG/ML
25 INJECTION INTRAMUSCULAR; INTRAVENOUS ONCE
Status: DISCONTINUED | OUTPATIENT
Start: 2025-03-01 | End: 2025-03-01

## 2025-03-01 RX ORDER — ACETAMINOPHEN 10 MG/ML
1000 INJECTION, SOLUTION INTRAVENOUS ONCE
Status: COMPLETED | OUTPATIENT
Start: 2025-03-01 | End: 2025-03-01

## 2025-03-01 RX ORDER — ONDANSETRON 4 MG/1
4 TABLET, FILM COATED ORAL EVERY 6 HOURS
Qty: 12 TABLET | Refills: 0 | Status: SHIPPED | OUTPATIENT
Start: 2025-03-01

## 2025-03-01 RX ORDER — METOCLOPRAMIDE HYDROCHLORIDE 5 MG/ML
10 INJECTION INTRAMUSCULAR; INTRAVENOUS ONCE
Status: COMPLETED | OUTPATIENT
Start: 2025-03-01 | End: 2025-03-01

## 2025-03-01 RX ADMIN — ONDANSETRON 4 MG: 2 INJECTION INTRAMUSCULAR; INTRAVENOUS at 08:45

## 2025-03-01 RX ADMIN — SODIUM CHLORIDE 1000 ML: 0.9 INJECTION, SOLUTION INTRAVENOUS at 07:12

## 2025-03-01 RX ADMIN — ACETAMINOPHEN 1000 MG: 10 INJECTION INTRAVENOUS at 07:55

## 2025-03-01 RX ADMIN — ONDANSETRON 4 MG: 2 INJECTION INTRAMUSCULAR; INTRAVENOUS at 07:15

## 2025-03-01 RX ADMIN — METOCLOPRAMIDE 10 MG: 5 INJECTION, SOLUTION INTRAMUSCULAR; INTRAVENOUS at 09:37

## 2025-03-01 NOTE — ED PROVIDER NOTES
Time reflects when diagnosis was documented in both MDM as applicable and the Disposition within this note       Time User Action Codes Description Comment    3/1/2025 11:14 AM Ender Thomas Add [R11.10,  R19.7] Vomiting and diarrhea           ED Disposition       ED Disposition   Discharge    Condition   Stable    Date/Time   Sat Mar 1, 2025 11:13 AM    Comment   Florence Chacko discharge to home/self care.                   Assessment & Plan       Medical Decision Making  Differential dx includes gastroenteritis, electrolyte abnormality  Labs reviewed and unremarkable. Pt given iv zofran x 2. Remained nauseous so given iv reglan with improvement. Pt tolerated oral fluid challenge. Re eval of abdomen: soft, nt/nd. Doubt intraabdominal pathology at this time  Plan is discharge home with Rx zofran  Clear liquids next 12-24 hrs then slow advancement of diet as tolerated  Return precautions reviewed    Amount and/or Complexity of Data Reviewed  Labs: ordered.    Risk  Prescription drug management.             Medications   sodium chloride 0.9 % bolus 1,000 mL (0 mL Intravenous Stopped 3/1/25 0839)   ondansetron (ZOFRAN) injection 4 mg (4 mg Intravenous Given 3/1/25 0715)   acetaminophen (Ofirmev) injection 1,000 mg (0 mg Intravenous Stopped 3/1/25 0839)   ondansetron (ZOFRAN) injection 4 mg (4 mg Intravenous Given 3/1/25 0845)   metoclopramide (REGLAN) injection 10 mg (10 mg Intravenous Given 3/1/25 0937)       ED Risk Strat Scores                                                History of Present Illness       Chief Complaint   Patient presents with    Abdominal Pain    Nausea     Symptoms started 4am, pt woke up with vomiting and diarrhea.       Past Medical History:   Diagnosis Date    Chronic pain       Past Surgical History:   Procedure Laterality Date    CHOLECYSTECTOMY        History reviewed. No pertinent family history.   Social History     Tobacco Use    Smoking status: Every Day     Current packs/day:  1.00     Types: Cigarettes    Smokeless tobacco: Never   Vaping Use    Vaping status: Every Day   Substance Use Topics    Alcohol use: Never    Drug use: Not Currently     Types: Other     Comment: 4-5 bags of fentenyl/day      E-Cigarette/Vaping    E-Cigarette Use Current Every Day User       E-Cigarette/Vaping Substances      I have reviewed and agree with the history as documented.     Patient is a 70 yo wf who reports onset 4 am this morning of vomiting and diarrhea accompanied by crampy abdominal pain. No fever. Works as nurse at [employer]. States there has been multiple residents of facility with similar symptoms recently. No other complaints         Review of Systems   Constitutional:  Negative for fever.   Respiratory:  Negative for shortness of breath.    Cardiovascular:  Negative for chest pain.   Gastrointestinal:  Positive for diarrhea and vomiting.           Objective       ED Triage Vitals   Temperature Pulse Blood Pressure Respirations SpO2 Patient Position - Orthostatic VS   03/01/25 0704 03/01/25 0703 03/01/25 0703 03/01/25 0703 03/01/25 0703 03/01/25 0703   98 °F (36.7 °C) 84 134/69 16 99 % Lying      Temp src Heart Rate Source BP Location FiO2 (%) Pain Score    -- 03/01/25 0703 03/01/25 0703 -- --     Monitor Left arm        Vitals      Date and Time Temp Pulse SpO2 Resp BP Pain Score FACES Pain Rating User   03/01/25 1100 -- 87 98 % 18 111/53 -- -- CECILIA   03/01/25 1041 -- 82 94 % 18 124/60 -- -- GC   03/01/25 0830 -- 83 97 % -- 126/59 -- -- CECILIA   03/01/25 0730 -- 89 96 % -- 120/59 -- -- SS   03/01/25 0704 98 °F (36.7 °C) -- -- -- -- -- -- SS   03/01/25 0703 -- 84 99 % 16 134/69 -- -- SS            Physical Exam  Vitals and nursing note reviewed.   Constitutional:       Appearance: She is well-developed.      Comments: Actively vomiting in the ED   HENT:      Head: Normocephalic and atraumatic.      Mouth/Throat:      Mouth: Mucous membranes are moist.      Pharynx: Oropharynx is clear.    Eyes:      Extraocular Movements: Extraocular movements intact.      Pupils: Pupils are equal, round, and reactive to light.   Cardiovascular:      Rate and Rhythm: Normal rate and regular rhythm.      Heart sounds: Normal heart sounds.   Pulmonary:      Effort: Pulmonary effort is normal.      Breath sounds: Normal breath sounds.   Abdominal:      General: Abdomen is flat. Bowel sounds are normal.      Palpations: Abdomen is soft.      Tenderness: There is no abdominal tenderness. There is no right CVA tenderness, left CVA tenderness, guarding or rebound. Negative signs include Noriega's sign, Rovsing's sign and McBurney's sign.      Hernia: No hernia is present.   Skin:     General: Skin is warm and dry.      Capillary Refill: Capillary refill takes less than 2 seconds.   Neurological:      Mental Status: She is alert.         Results Reviewed       Procedure Component Value Units Date/Time    CBC and differential [[id number]]  (Abnormal) Collected: 03/01/25 0714    Lab Status: Final result Specimen: Blood from Arm, Right Updated: 03/01/25 0757     WBC 12.47 Thousand/uL      RBC 4.61 Million/uL      Hemoglobin 13.9 g/dL      Hematocrit 44.6 %      MCV 97 fL      MCH 30.2 pg      MCHC 31.2 g/dL      RDW 12.0 %      MPV 9.6 fL      Platelets 285 Thousands/uL      nRBC 0 /100 WBCs      Segmented % 92 %      Immature Grans % 0 %      Lymphocytes % 3 %      Monocytes % 4 %      Eosinophils Relative 1 %      Basophils Relative 0 %      Absolute Neutrophils 11.40 Thousands/µL      Absolute Immature Grans 0.02 Thousand/uL      Absolute Lymphocytes 0.38 Thousands/µL      Absolute Monocytes 0.52 Thousand/µL      Eosinophils Absolute 0.12 Thousand/µL      Basophils Absolute 0.03 Thousands/µL     Narrative:      This is an appended report.  These results have been appended to a previously verified report.    Smear Review(Phlebs Do Not Order) [[id number]] Collected: 03/01/25 0714    Lab Status: Final result Specimen: Blood from  Arm, Right Updated: 03/01/25 0757     RBC Morphology Normal     Platelet Estimate Adequate    Comprehensive metabolic panel [[id number]] Collected: 03/01/25 0714    Lab Status: Final result Specimen: Blood from Arm, Right Updated: 03/01/25 0747     Sodium 141 mmol/L      Potassium 3.5 mmol/L      Chloride 102 mmol/L      CO2 26 mmol/L      ANION GAP 13 mmol/L      BUN 19 mg/dL      Creatinine 0.82 mg/dL      Glucose 128 mg/dL      Calcium 9.0 mg/dL      AST 20 U/L      ALT 16 U/L      Alkaline Phosphatase 63 U/L      Total Protein 7.4 g/dL      Albumin 4.2 g/dL      Total Bilirubin 0.40 mg/dL      eGFR 73 ml/min/1.73sq m     Narrative:      [employer] guidelines for Chronic Kidney Disease (CKD):     Stage 1 with normal or high GFR (GFR > 90 mL/min/1.73 square meters)    Stage 2 Mild CKD (GFR = 60-89 mL/min/1.73 square meters)    Stage 3A Moderate CKD (GFR = 45-59 mL/min/1.73 square meters)    Stage 3B Moderate CKD (GFR = 30-44 mL/min/1.73 square meters)    Stage 4 Severe CKD (GFR = 15-29 mL/min/1.73 square meters)    Stage 5 End Stage CKD (GFR <15 mL/min/1.73 square meters)  Note: GFR calculation is accurate only with a steady state creatinine    Lipase [[id number]]  (Normal) Collected: 03/01/25 0714    Lab Status: Final result Specimen: Blood from Arm, Right Updated: 03/01/25 0747     Lipase 21 u/L             No orders to display       Procedures    ED Medication and Procedure Management   Prior to Admission Medications   Prescriptions Last Dose Informant Patient Reported? Taking?   albuterol (ProAir HFA) 90 mcg/act inhaler   No No   Sig: Inhale 2 puffs every 6 (six) hours as needed for wheezing   Patient not taking: Reported on 4/4/2023   cloNIDine (CATAPRES) 0.2 mg tablet   No No   Sig: Take 0.5 tablets (0.1 mg total) by mouth 2 (two) times a day   Patient not taking: Reported on 8/22/2021   methadone (DOLOPHINE) 10 mg/mL oral concentrated solution   Yes No   Sig: Take 90 mg by mouth daily    nortriptyline (PAMELOR) 50 mg capsule   Yes No   Sig: Take 100 mg by mouth daily at bedtime   Patient not taking: Reported on 8/22/2021   tiZANidine (Zanaflex) 4 mg tablet   Yes No   Sig: Take 4 mg by mouth daily at bedtime   Patient not taking: Reported on 8/22/2021   traMADol (ULTRAM) 50 mg tablet   Yes No   Sig: Take 100 mg by mouth 3 (three) times a day   Patient not taking: Reported on 8/22/2021      Facility-Administered Medications: None     Patient's Medications   Discharge Prescriptions    ONDANSETRON (ZOFRAN) 4 MG TABLET    Take 1 tablet (4 mg total) by mouth every 6 (six) hours       Start Date: 3/1/2025  End Date: --       Order Dose: 4 mg       Quantity: 12 tablet    Refills: 0     No discharge procedures on file.  ED SEPSIS DOCUMENTATION   Time reflects when diagnosis was documented in both MDM as applicable and the Disposition within this note       Time User Action Codes Description Comment    3/1/2025 11:14 AM Ender Thomas Add [R11.10,  R19.7] Vomiting and diarrhea                  Ender Thomas PA-C  03/01/25 1159

## 2025-03-01 NOTE — DISCHARGE INSTRUCTIONS
Zofran for nausea/vomiting    Clear liquids next 12-24 hours then slow advancement of diet as tolerated    Follow up with primary care provider of your choice next 1-2 days for recheck if any persistent concerns    Return to ED for intractable vomiting, worsening symptoms